# Patient Record
Sex: FEMALE | Race: OTHER | HISPANIC OR LATINO | URBAN - METROPOLITAN AREA
[De-identification: names, ages, dates, MRNs, and addresses within clinical notes are randomized per-mention and may not be internally consistent; named-entity substitution may affect disease eponyms.]

---

## 2017-12-11 ENCOUNTER — EMERGENCY (EMERGENCY)
Facility: HOSPITAL | Age: 52
LOS: 1 days | Discharge: HOME | End: 2017-12-11
Attending: STUDENT IN AN ORGANIZED HEALTH CARE EDUCATION/TRAINING PROGRAM | Admitting: STUDENT IN AN ORGANIZED HEALTH CARE EDUCATION/TRAINING PROGRAM
Payer: COMMERCIAL

## 2017-12-11 VITALS
TEMPERATURE: 98 F | RESPIRATION RATE: 20 BRPM | DIASTOLIC BLOOD PRESSURE: 92 MMHG | HEART RATE: 97 BPM | HEIGHT: 62 IN | SYSTOLIC BLOOD PRESSURE: 165 MMHG | WEIGHT: 160.06 LBS | OXYGEN SATURATION: 97 %

## 2017-12-11 PROCEDURE — 71020: CPT | Mod: 26

## 2017-12-11 PROCEDURE — 96372 THER/PROPH/DIAG INJ SC/IM: CPT

## 2017-12-11 PROCEDURE — 99284 EMERGENCY DEPT VISIT MOD MDM: CPT | Mod: 25

## 2017-12-11 PROCEDURE — 71046 X-RAY EXAM CHEST 2 VIEWS: CPT

## 2017-12-11 PROCEDURE — 99283 EMERGENCY DEPT VISIT LOW MDM: CPT | Mod: 25

## 2017-12-11 PROCEDURE — 94640 AIRWAY INHALATION TREATMENT: CPT

## 2017-12-11 RX ORDER — DEXAMETHASONE 0.5 MG/5ML
10 ELIXIR ORAL ONCE
Qty: 0 | Refills: 0 | Status: COMPLETED | OUTPATIENT
Start: 2017-12-11 | End: 2017-12-11

## 2017-12-11 RX ORDER — ALBUTEROL 90 UG/1
2 AEROSOL, METERED ORAL
Qty: 1 | Refills: 0 | OUTPATIENT
Start: 2017-12-11 | End: 2017-12-17

## 2017-12-11 RX ORDER — PSEUDOEPHEDRINE HCL 30 MG
1 TABLET ORAL
Qty: 8 | Refills: 0 | OUTPATIENT
Start: 2017-12-11 | End: 2017-12-14

## 2017-12-11 RX ORDER — ALBUTEROL 90 UG/1
2.5 AEROSOL, METERED ORAL ONCE
Qty: 0 | Refills: 0 | Status: COMPLETED | OUTPATIENT
Start: 2017-12-11 | End: 2017-12-11

## 2017-12-11 RX ORDER — PSEUDOEPHEDRINE HCL 30 MG
60 TABLET ORAL ONCE
Qty: 0 | Refills: 0 | Status: COMPLETED | OUTPATIENT
Start: 2017-12-11 | End: 2017-12-11

## 2017-12-11 RX ADMIN — ALBUTEROL 2.5 MILLIGRAM(S): 90 AEROSOL, METERED ORAL at 03:49

## 2017-12-11 RX ADMIN — Medication 200 MILLIGRAM(S): at 03:48

## 2017-12-11 RX ADMIN — Medication 10 MILLIGRAM(S): at 03:47

## 2017-12-11 RX ADMIN — Medication 60 MILLIGRAM(S): at 03:48

## 2017-12-11 NOTE — ED ADULT NURSE NOTE - OBJECTIVE STATEMENT
Pt received in [facility] c/o multiple medical problems. Pt states she had a fever 3 days ago but has since resolved. Pt with no productive cough and causes pain to R ear and throat. Pt also c/o back pain x5days and states the coughing is probably why her back hurts. Pt has no medical hx. Pt denies chest pain, SOB, fever/chills, N/V/D, abd pain, bladder/bowel dysfunction.

## 2017-12-11 NOTE — ED PROVIDER NOTE - PROGRESS NOTE DETAILS
PA NOTE: PT seen resting comfortably in no acute distress, no acute complaints at this time, PT tolerating PO intake,  PT informed of all results, pt informed of possibility of change in radiology read after official read, RESP: Clear to Auscultation BL, no wheeze, rales, rhonchi.  PT will be DC home with anti-tussive, albuterol, decongestants,  pt educated about when to return to the ED if needed. PT verbalizes that he understands all instructions and results

## 2017-12-11 NOTE — ED PROVIDER NOTE - OBJECTIVE STATEMENT
53 yo f, with no PMHx, presents to ED c/o worsening non productive cough associated with right ear pain, post tussive vomiting, throat pain and back pain x 5 days. Denies SOB, orthopnea. One episode of fever 3 days ago.  No relief with antihistamine, Tylenol, and Robitussin. PSHx includes D&C. No routine medications. Social alcohol. Denies tobacco and drug use. No primary care.

## 2017-12-11 NOTE — ED PROVIDER NOTE - ATTENDING CONTRIBUTION TO CARE
51 yo female with cough. I personally saw the patient with the PA, and completed the key components of the history and physical exam. I then discussed the management plan with the PA.

## 2021-03-05 NOTE — ED ADULT NURSE NOTE - NS ED NURSE LEVEL OF CONSCIOUSNESS SPEECH
Speaking Coherently
Breath Sounds equal & clear to percussion & auscultation, no accessory muscle use